# Patient Record
Sex: MALE | URBAN - METROPOLITAN AREA
[De-identification: names, ages, dates, MRNs, and addresses within clinical notes are randomized per-mention and may not be internally consistent; named-entity substitution may affect disease eponyms.]

---

## 2023-02-20 ENCOUNTER — EMERGENCY (EMERGENCY)
Facility: HOSPITAL | Age: 63
LOS: 1 days | Discharge: HOME | End: 2023-02-20
Attending: EMERGENCY MEDICINE
Payer: COMMERCIAL

## 2023-02-20 VITALS
TEMPERATURE: 98 F | OXYGEN SATURATION: 97 % | HEIGHT: 66 IN | RESPIRATION RATE: 20 BRPM | HEART RATE: 74 BPM | SYSTOLIC BLOOD PRESSURE: 118 MMHG | WEIGHT: 134.92 LBS | DIASTOLIC BLOOD PRESSURE: 59 MMHG

## 2023-02-20 PROCEDURE — 99284 EMERGENCY DEPT VISIT MOD MDM: CPT

## 2023-02-21 DIAGNOSIS — R09.89 OTHER SPECIFIED SYMPTOMS AND SIGNS INVOLVING THE CIRCULATORY AND RESPIRATORY SYSTEMS: ICD-10-CM

## 2023-02-21 PROCEDURE — 70490 CT SOFT TISSUE NECK W/O DYE: CPT | Mod: MA

## 2023-02-21 PROCEDURE — 31525 DX LARYNGOSCOPY EXCL NB: CPT

## 2023-02-21 PROCEDURE — 70490 CT SOFT TISSUE NECK W/O DYE: CPT | Mod: 26,MA

## 2023-02-21 PROCEDURE — 99285 EMERGENCY DEPT VISIT HI MDM: CPT | Mod: 25

## 2023-02-21 PROCEDURE — 31505 DIAGNOSTIC LARYNGOSCOPY: CPT

## 2023-02-21 NOTE — ED ADULT NURSE NOTE - OBJECTIVE STATEMENT
62y Male complaining of foreign body throat. pt states he swallowed fish bone today. Still able to drink and eat.

## 2023-02-21 NOTE — ED PROVIDER NOTE - PATIENT PORTAL LINK FT
You can access the FollowMyHealth Patient Portal offered by [facility] by registering at the following website: [email or web address]. By joining [employer]’s FollowMyHealth portal, you will also be able to view your health information using other applications (apps) compatible with our system.

## 2023-02-21 NOTE — ED PROVIDER NOTE - PHYSICAL EXAMINATION
GENERAL: Awake, alert, NAD  HEENT: NC/AT, moist mucous membranes, PERRL, EOMI. No posterior pharyngeal erythema. No visible foreign body in oropharynx.   LUNGS: CTAB, no wheezes or crackles   CARDIAC: RRR, no m/r/g  ABDOMEN: Soft, normal BS, non tender, non distended, no rebound, no guarding  BACK: No midline spinal tenderness, no CVA tenderness  EXT: No edema, no calf tenderness, 2+ DP pulses bilaterally, no deformities.  NEURO: A&Ox3. Moving all extremities.  SKIN: Warm and dry. No rash.  PSYCH: Normal affect.

## 2023-02-21 NOTE — CONSULT NOTE ADULT - SUBJECTIVE AND OBJECTIVE BOX
CC: FB Sensation in Throat.    HPI: 61 YO M with no significant PMH/PSH presents to [facility] ED for evaluation of FB sensation in Throat. Patient states he swallowed a fishbone about 6 PM. Feels that it is stuck in the right side of his neck.  Went to urgent care, where  performed a lateral neck x-ray which did not reveal any foreign body. Pt tried to cough it out, drank water without relief. Pt remains on RA, no SOB. Currently able to handle secretions. No drooling/ trismus/ stridor/hoarseness noted.  Tolerates regular diet at home. Denies Neck surgeries/ trauma, fever/chills, SOB, CP, cough/URI/ Rhinorrhea, Ear  pain, Abdominal Pain/N/V, recent travel/sick contacts.     PAST MEDICAL & SURGICAL HISTORY:  No pertinent past medical history    Allergies  Allergy Status Unknown    Intolerances    MEDICATIONS  (STANDING):    MEDICATIONS  (PRN):    Social History: No smoking/alcohol/drug use.   Family history: None.     ROS:   ENT: all negative except as noted in HPI   CV: denies palpitations  Pulm: denies SOB, cough, hemoptysis  GI: denies change in appetite indigestion, n/v  : denies pertinent urinary symptoms, urgency  Neuro: denies numbness/tingling, loss of sensation  Psych: denies anxiety  MS: denies muscle weakness, instability  Heme: denies easy bruising or bleeding  Endo: denies heat/cold intolerance, excessive sweating  Vascular: denies LE edema    Vital Signs Last 24 Hrs  T(C): 36.8 (20 Feb 2023 21:54), Max: 36.8 (20 Feb 2023 21:54)  T(F): 98.2 (20 Feb 2023 21:54), Max: 98.2 (20 Feb 2023 21:54)  HR: 74 (20 Feb 2023 21:54) (74 - 74)  BP: 118/59 (20 Feb 2023 21:54) (118/59 - 118/59)  BP(mean): --  RR: 20 (20 Feb 2023 21:54) (20 - 20)  SpO2: 97% (20 Feb 2023 21:54) (97% - 97%)  Parameters below as of 20 Feb 2023 21:54  Patient On (Oxygen Delivery Method): room air     PHYSICAL EXAM:  Gen: NAD, On RA.   Skin: No rashes, bruises, or lesions  Head: Normocephalic, Atraumatic  Face: no edema, erythema, or fluctuance. Parotid glands soft without mass  Eyes: no scleral injection  Nose: Nares bilaterally patent, no discharge  Mouth: No Stridor / Drooling / Trismus.  Mucosa moist, tongue/uvula midline, oropharynx clear  Neck: Flat, supple, no lymphadenopathy, trachea midline, no masses  Lymphatic: No lymphadenopathy  Resp: breathing easily, no stridor  CV: no peripheral edema/cyanosis  GI: nondistended   Peripheral vascular: no JVD or edema  Neuro: facial nerve intact, no facial droop    Fiberoptic Indirect laryngoscopy:  (Scope #2 used)  Reason for Laryngoscopy: FB Sensation in Throat.  Airway patent, no foreign body visualized.     Nasopharynx, oropharynx, and hypopharynx clear, no bleeding. Tongue base, posterior pharyngeal wall, vallecula, epiglottis, and subglottis appear normal. No erythema, edema, pooling of secretions, masses or lesions. . No glottic/supraglottic edema. True vocal cords, arytenoids, vestibular folds, ventricles, pyriform sinuses, and aryepiglottic folds appear normal bilaterally. Vocal cords mobile with good contact b/l.     IMAGING/ADDITIONAL STUDIES:   CT Neck: FINDINGS:    Airway: There is no soft tissue mass along the mucosal surfaces of the   aerodigestive tract.   There is no extrinsic mass effect upon the airway.    Lymph Nodes: There is no cervical lymphadenopathy by CT size or   morphologic criteria.    Salivary Glands: The parotid glands and submandibular glands are within   normal limits.    Thyroid: The thyroid gland is unremarkable.    Facial Bones: The visualized facial bones are intact. There is no   abnormal facial soft tissue swelling.  Partial opacification of the   ethmoid air cells.    Soft Tissues: No soft tissue gas or radiopaque foreign body.  There is no   prevertebral edema, fluid collection, or paraspinal mass. Calcification   in the left palatine tonsil related to previous inflammation.  There is   no other focal soft tissue abnormality.    Airspaces: The paranasal sinuses are clear.  The mastoid air cells are   clear.    Intracranial: The orbits are unremarkable.   The visualized intracranial   compartment is unremarkable.    Spine: The cervical spine is unremarkable.    Lungs: The visualized lung apices are clear.    IMPRESSION:    Unremarkable CT examination of the neck.  No soft tissue gas, radiopaque foreign body or focal swelling.  If   clinical concern persists, ENT consultation may be considered for direct   visualization of a radiolucent foreign body.

## 2023-02-21 NOTE — ED PROVIDER NOTE - OBJECTIVE STATEMENT
62-year-old male no reported past medical history referred from urgent care for foreign body sensation in neck.  Patient states he swallowed a fishbone about 6 PM.  Feels that it is stuck in the right side of his neck.  Urgent care performed a lateral neck x-ray which did not reveal any foreign body.  Patient complains only of pain in the neck.  Denies chest pain, shortness of breath, nausea/vomiting, abdominal pain.

## 2023-02-21 NOTE — ED PROVIDER NOTE - CLINICAL SUMMARY MEDICAL DECISION MAKING FREE TEXT BOX
62-year-old male no reported past medical history referred from urgent care for foreign body sensation in neck. VSS. Well appearing. In no respiratory distress. Will get CT neck soft tissue to eval for impacted fishbone, ENT consult if necessary.

## 2023-02-21 NOTE — ED PROVIDER NOTE - DIFFERENTIAL DIAGNOSIS
Differential Diagnosis FB sensation in throat after eating fish: tolerating secretions, comfortable respirations on RA, CT neck r/o fish bone

## 2023-02-21 NOTE — ED PROVIDER NOTE - NSFOLLOWUPINSTRUCTIONS_ED_ALL_ED_FT
You were evaluated for the sensation of a fishbone stuck in your throat in the ER.  A CT scan of your neck did not reveal the presence of a fishbone.  An ears nose and throat specialist did not visualize a fishbone in the back of your throat using the bedside camera.  Your throat pain should improve within the next few days.  You may use over-the-counter throat lozenges  or a teaspoon of honey as needed for throat pain.  If you experience worsening pain, fevers, trouble swallowing, or if you have any concerns please seek immediate medical attention.

## 2023-02-21 NOTE — CONSULT NOTE ADULT - ASSESSMENT
61 YO M with no significant PMH/PSH presents to [facility] ED for evaluation of FB sensation in Throat. Patient states he swallowed a fishbone about 6 PM. Fiber optic Indirect Laryngoscopy showed, Airway patent, no foreign body visualized.  CT of Neck No radiopaque foreign body visualized.

## 2023-02-21 NOTE — CONSULT NOTE ADULT - PROBLEM SELECTOR RECOMMENDATION 9
-  FOE: Airway patent, no foreign body visualized.   - CT of Neck: No radiopaque foreign body visualized.   - If pt continued to have symptoms, consider GI evaluation.  - PPI.   - Call ENT with questions.    # [id number]  ENT PA

## 2023-02-21 NOTE — ED PROVIDER NOTE - ATTENDING CONTRIBUTION TO CARE
Afebrile. Awake and Alert. Mouth: Oropharynx clear, uvula midline, no tonsilar hypertrophy, exudate or erythema, no anterior cervical lymphadenopathy. No drooling. No hoarseness. CN II-XII grossly intact. Moves all extremities without lateralization.